# Patient Record
Sex: FEMALE | Race: BLACK OR AFRICAN AMERICAN | NOT HISPANIC OR LATINO | URBAN - METROPOLITAN AREA
[De-identification: names, ages, dates, MRNs, and addresses within clinical notes are randomized per-mention and may not be internally consistent; named-entity substitution may affect disease eponyms.]

---

## 2020-07-12 ENCOUNTER — EMERGENCY (EMERGENCY)
Facility: HOSPITAL | Age: 25
LOS: 0 days | Discharge: HOME | End: 2020-07-12
Attending: EMERGENCY MEDICINE
Payer: COMMERCIAL

## 2020-07-12 VITALS
HEIGHT: 64 IN | RESPIRATION RATE: 20 BRPM | OXYGEN SATURATION: 99 % | SYSTOLIC BLOOD PRESSURE: 87 MMHG | HEART RATE: 95 BPM | DIASTOLIC BLOOD PRESSURE: 83 MMHG | TEMPERATURE: 99 F | WEIGHT: 132.06 LBS

## 2020-07-12 VITALS
OXYGEN SATURATION: 99 % | HEART RATE: 90 BPM | DIASTOLIC BLOOD PRESSURE: 75 MMHG | SYSTOLIC BLOOD PRESSURE: 123 MMHG | RESPIRATION RATE: 19 BRPM

## 2020-07-12 PROCEDURE — 99284 EMERGENCY DEPT VISIT MOD MDM: CPT

## 2020-07-12 PROCEDURE — 93010 ELECTROCARDIOGRAM REPORT: CPT

## 2020-07-12 PROCEDURE — 71045 X-RAY EXAM CHEST 1 VIEW: CPT | Mod: 26

## 2020-07-12 RX ORDER — SIMETHICONE 80 MG/1
1 TABLET, CHEWABLE ORAL
Qty: 21 | Refills: 0
Start: 2020-07-12 | End: 2020-07-18

## 2020-07-12 RX ORDER — SIMETHICONE 80 MG/1
80 TABLET, CHEWABLE ORAL ONCE
Refills: 0 | Status: DISCONTINUED | OUTPATIENT
Start: 2020-07-12 | End: 2020-07-12

## 2020-07-12 RX ORDER — CALCIUM CARBONATE 500(1250)
1 TABLET ORAL
Qty: 7 | Refills: 0
Start: 2020-07-12 | End: 2020-07-18

## 2020-07-12 RX ADMIN — Medication 30 MILLILITER(S): at 21:44

## 2020-07-12 NOTE — ED ADULT TRIAGE NOTE - CHIEF COMPLAINT QUOTE
Pt c/o chest discomfort and excessive burping x yesterday. pt stated she drank hot tea to help at home, with no relief.

## 2020-07-12 NOTE — ED PROVIDER NOTE - OBJECTIVE STATEMENT
23 yo F with epigastric discomfort, feels like she has gas that needs to come out, but can't burp it out.  Pt. was worried because it feels tight in her chest.  She felt this once before a week ago and it went out.   ROS: negative for fever, cough, headache, chest pain, shortness of breath, abd pain, nausea, vomiting, diarrhea, rash, paresthesia, and weakness--all other systems reviewed are negative.   PMH: Denies; Meds: Denies; SH: Denies smoking/drinking/drug use

## 2020-07-12 NOTE — ED PROVIDER NOTE - PATIENT PORTAL LINK FT
You can access the FollowMyHealth Patient Portal offered by [facility] by registering at the following website: [email or web address]. By joining [email or web address]’s FollowMyHealth portal, you will also be able to view your health information using other applications (apps) compatible with our system.

## 2020-07-12 NOTE — ED PROVIDER NOTE - PROGRESS NOTE DETAILS
Results reported to patient--grossly benign, XR clear   Pt. reports feeling better after meds  pt. agrees to f/u with primary care outpt., referred to GI for f/u   pt. understands to return to ED if symptoms worsen; will d/c with meds

## 2020-07-12 NOTE — ED PROVIDER NOTE - CLINICAL SUMMARY MEDICAL DECISION MAKING FREE TEXT BOX
23 yo F with indigestion/gas, doubt cardiac ischemia/arrhythmi/pneumo  -cxr, ekg, maalox and simethicone  -f/u results, reeval

## 2020-07-12 NOTE — ED PROVIDER NOTE - CARE PROVIDER_API CALL
Francesco Verma [address] [address]  Phone: [phone]  Fax: [phone]  Follow Up Time: 4-6 Days

## 2020-07-14 DIAGNOSIS — R10.13 EPIGASTRIC PAIN: ICD-10-CM

## 2020-07-14 DIAGNOSIS — K30 FUNCTIONAL DYSPEPSIA: ICD-10-CM

## 2020-07-21 ENCOUNTER — EMERGENCY (EMERGENCY)
Facility: HOSPITAL | Age: 25
LOS: 1 days | Discharge: HOME | End: 2020-07-21
Attending: EMERGENCY MEDICINE | Admitting: EMERGENCY MEDICINE
Payer: COMMERCIAL

## 2020-07-21 VITALS
RESPIRATION RATE: 16 BRPM | HEIGHT: 64 IN | TEMPERATURE: 98 F | SYSTOLIC BLOOD PRESSURE: 113 MMHG | HEART RATE: 90 BPM | WEIGHT: 134.92 LBS | DIASTOLIC BLOOD PRESSURE: 65 MMHG | OXYGEN SATURATION: 100 %

## 2020-07-21 PROCEDURE — 99283 EMERGENCY DEPT VISIT LOW MDM: CPT

## 2020-07-22 PROBLEM — Z78.9 OTHER SPECIFIED HEALTH STATUS: Chronic | Status: ACTIVE | Noted: 2020-07-12

## 2020-07-22 RX ORDER — FAMOTIDINE 10 MG/ML
20 INJECTION INTRAVENOUS DAILY
Refills: 0 | Status: DISCONTINUED | OUTPATIENT
Start: 2020-07-22 | End: 2020-07-25

## 2020-07-22 RX ORDER — LIDOCAINE 4 G/100G
10 CREAM TOPICAL ONCE
Refills: 0 | Status: COMPLETED | OUTPATIENT
Start: 2020-07-22 | End: 2020-07-22

## 2020-07-22 RX ORDER — FAMOTIDINE 10 MG/ML
1 INJECTION INTRAVENOUS
Qty: 14 | Refills: 0
Start: 2020-07-22 | End: 2020-08-04

## 2020-07-22 RX ADMIN — LIDOCAINE 10 MILLILITER(S): 4 CREAM TOPICAL at 01:12

## 2020-07-22 RX ADMIN — FAMOTIDINE 20 MILLIGRAM(S): 10 INJECTION INTRAVENOUS at 01:15

## 2020-07-22 RX ADMIN — Medication 30 MILLILITER(S): at 01:12

## 2020-07-22 NOTE — ED PROVIDER NOTE - OBJECTIVE STATEMENT
23 yo F no sig pmh presents with sore throat.  Patient states she was cooking around 8 pm when she felt acute onset pain in her throat.  It is located below thyroid level at midline, sharp, worse with swallowing.  Able to tolerate solids and liquids, no voice changes.  Patient reports seeing GI doctor earlier to day for "Gas", was prescribed medication to pharmacy but does not know what this was.  Has appointment with ENT in two days.  Denies sharp food consumption or fish bones.  Denies fever, chills, nausea, vomiting, chest pain, abdominal pain.  Occasional marijuana user.  Denies etoh or tobacco use.

## 2020-07-22 NOTE — ED PROVIDER NOTE - CLINICAL SUMMARY MEDICAL DECISION MAKING FREE TEXT BOX
25 yo F no sig pmh presents with sore throat x1 day.  VSS AF.  Exam non-toxic appearing, oropharyngeal exam unremarkable, non-tender thyroid.  No clear infectious etiology, tonsils and uvula appear normal.  Non-tender thyroid, low c/f thyroiditis.  No history suggestive of impacted foreign body.  Neck supple, low c/f retropharyngeal abscess.  Possible gerd given patient follow with GI.  Low c/f atypical ACS overall, but will obtain ekg.  Plan for supportive care.  Patient has follow up with ent in two days.

## 2020-07-22 NOTE — ED PROVIDER NOTE - PATIENT PORTAL LINK FT
You can access the FollowMyHealth Patient Portal offered by [facility] by registering at the following website: [email or web address]. By joining [employer]’s FollowMyHealth portal, you will also be able to view your health information using other applications (apps) compatible with our system.

## 2020-07-22 NOTE — ED PROVIDER NOTE - PROGRESS NOTE DETAILS
JONES:  EKG without acute ischemic change.  Patient reports improvement in sx.  Exam remains unremarkable.  Patient has ENT clinic appointment in two days.

## 2020-07-22 NOTE — ED ADULT NURSE NOTE - OBJECTIVE STATEMENT
Pt received in INT10C. C/o sore throat since this morning. Denies SOB. Denies difficulty swallowing. Denies fever or chills. A&Ox4, ambulatory. Breathing even and unlabored. Pt able to speak in full sentences. Not in apparent distress. Medications given as per provider's order. Will continue to monitor.

## 2020-07-22 NOTE — ED PROVIDER NOTE - PHYSICAL EXAMINATION
GEN:  Non-toxic appearing, non-distressed, speaking full sentences, non-diaphoretic, AAOx3  HEENT:  NCAT, neck supple, EOMI, PERRLA, sclera anicteric, no conjunctival pallor or injection, no stridor, normal voice, no tonsillar exudate, uvula midline, tympanic membranes and external auditory canals normal appearing bilaterally, non-tender thyroid  CV:  regular rhythm and rate, s1/s2 audible, no murmurs, rubs or gallops, peripheral pulses 2+ and symmetric  PULM:  non-labored respirations, lungs clear to auscultation bilaterally, no wheezes, crackles or rales  ABD:  non distended, non-tender, no rebound, no guarding, negative Knapp's sign, bowel sounds normal, no cvat  MSK:  no gross deformity, non-tender extremities and joints, range of motion grossly normal appearing, no extremity edema, extremities warm and well perfused   NEURO:  AAOx3, CN II-XII intact, motor 5/5 in upper and lower extremities bilaterally, sensation grossly intact in extremities and trunk, finger to nose testing wnl, no nystagmus, negative Romberg, no pronator drift, no gait deficit  SKIN:  warm, dry, no rash or vesicles

## 2022-05-04 ENCOUNTER — EMERGENCY (EMERGENCY)
Facility: HOSPITAL | Age: 27
LOS: 1 days | Discharge: HOME | End: 2022-05-04
Attending: STUDENT IN AN ORGANIZED HEALTH CARE EDUCATION/TRAINING PROGRAM | Admitting: STUDENT IN AN ORGANIZED HEALTH CARE EDUCATION/TRAINING PROGRAM
Payer: MEDICAID

## 2022-05-04 VITALS
HEIGHT: 64 IN | OXYGEN SATURATION: 100 % | DIASTOLIC BLOOD PRESSURE: 62 MMHG | HEART RATE: 100 BPM | RESPIRATION RATE: 16 BRPM | TEMPERATURE: 98 F | SYSTOLIC BLOOD PRESSURE: 119 MMHG

## 2022-05-04 PROCEDURE — 70450 CT HEAD/BRAIN W/O DYE: CPT | Mod: 26,MB

## 2022-05-04 PROCEDURE — 99284 EMERGENCY DEPT VISIT MOD MDM: CPT

## 2022-05-04 RX ORDER — IBUPROFEN 200 MG
400 TABLET ORAL ONCE
Refills: 0 | Status: COMPLETED | OUTPATIENT
Start: 2022-05-04 | End: 2022-05-04

## 2022-05-04 RX ORDER — ACETAMINOPHEN 500 MG
650 TABLET ORAL ONCE
Refills: 0 | Status: COMPLETED | OUTPATIENT
Start: 2022-05-04 | End: 2022-05-04

## 2022-05-04 RX ADMIN — Medication 400 MILLIGRAM(S): at 16:19

## 2022-05-04 RX ADMIN — Medication 650 MILLIGRAM(S): at 16:20

## 2022-05-04 RX ADMIN — Medication 1 TABLET(S): at 16:19
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
## 2022-05-04 NOTE — ED PROVIDER NOTE - PATIENT PORTAL LINK FT
You can access the FollowMyHealth Patient Portal offered by [facility] by registering at the following website: [email or web address]. By joining [employer]’s FollowMyHealth portal, you will also be able to view your health information using other applications (apps) compatible with our system.
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
## 2022-05-04 NOTE — ED PROVIDER NOTE - NS ED ROS FT
CONSTITUTIONAL: No fever,  Pulmonary: no cough  CARDIOVASCULAR: No chest pain,  GI: No abdominal pain  MUSKULOSKELETAL: No new pain in joints/muscles  SKIN: New bite marks  ALL OTHER SYSTEMS NEGATIVE.
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
## 2022-08-05 NOTE — ED ADULT NURSE NOTE - OBJECTIVE STATEMENT
24 year old female presents to ED with c/o epigastric pain and belching. Presents to ED a&ox3, in no acute distress. abd soft, non-distended, non-tender. Denies fever/chills,  n/v/d/urinary complaints, or other symptoms.
no

## 2024-12-30 ENCOUNTER — EMERGENCY (EMERGENCY)
Facility: HOSPITAL | Age: 29
LOS: 1 days | Discharge: HOME | End: 2024-12-30
Admitting: STUDENT IN AN ORGANIZED HEALTH CARE EDUCATION/TRAINING PROGRAM
Payer: MEDICAID

## 2024-12-30 VITALS
RESPIRATION RATE: 16 BRPM | WEIGHT: 130.07 LBS | DIASTOLIC BLOOD PRESSURE: 73 MMHG | TEMPERATURE: 98 F | OXYGEN SATURATION: 99 % | SYSTOLIC BLOOD PRESSURE: 116 MMHG | HEART RATE: 81 BPM

## 2024-12-30 PROCEDURE — 93010 ELECTROCARDIOGRAM REPORT: CPT

## 2024-12-30 PROCEDURE — 99284 EMERGENCY DEPT VISIT MOD MDM: CPT

## 2024-12-30 RX ORDER — KETOROLAC TROMETHAMINE 30 MG/ML
15 INJECTION INTRAMUSCULAR; INTRAVENOUS ONCE
Refills: 0 | Status: DISCONTINUED | OUTPATIENT
Start: 2024-12-30 | End: 2024-12-30

## 2024-12-30 RX ORDER — METOCLOPRAMIDE HYDROCHLORIDE 10 MG/1
10 TABLET ORAL ONCE
Refills: 0 | Status: COMPLETED | OUTPATIENT
Start: 2024-12-30 | End: 2024-12-30

## 2024-12-30 RX ORDER — SODIUM CHLORIDE 9 MG/ML
1000 INJECTION, SOLUTION INTRAMUSCULAR; INTRAVENOUS; SUBCUTANEOUS ONCE
Refills: 0 | Status: COMPLETED | OUTPATIENT
Start: 2024-12-30 | End: 2024-12-30

## 2024-12-30 RX ADMIN — KETOROLAC TROMETHAMINE 15 MILLIGRAM(S): 30 INJECTION INTRAMUSCULAR; INTRAVENOUS at 10:41

## 2024-12-30 RX ADMIN — SODIUM CHLORIDE 1000 MILLILITER(S): 9 INJECTION, SOLUTION INTRAMUSCULAR; INTRAVENOUS; SUBCUTANEOUS at 10:40

## 2024-12-30 RX ADMIN — METOCLOPRAMIDE HYDROCHLORIDE 10 MILLIGRAM(S): 10 TABLET ORAL at 10:41

## 2024-12-30 NOTE — ED ADULT NURSE NOTE - OBJECTIVE STATEMENT
Patient A&o X4, received in intake , with complaints of headache/SOB. Patient states, "I have been having a headache since 4am this morning ". Patient also complains of intermittent SOB, reports feeling anxious. Patient able to speak in clear sentences, respirations equal and unlabored. Lung sounds clear b/l, equal chest rise and fall noted. Denies CP, fever, chills, nausea, vomiting and diarrhea at this time. Skin warm and dry. Provider at bedside for eval, pending further orders.

## 2024-12-30 NOTE — ED PROVIDER NOTE - NSFOLLOWUPINSTRUCTIONS_ED_ALL_ED_FT
Migraine Headache  A migraine headache is a very strong throbbing pain on one or both sides of your head. This type of headache can also cause other symptoms. It can last from 4 hours to 3 days. Talk with your doctor about what things may bring on (trigger) this condition.    What are the causes?  The exact cause of a migraine is not known. This condition may be brought on or caused by:  Smoking.  Medicines, such as:  Medicine used to treat chest pain (nitroglycerin).  Birth control pills.  Estrogen.  Some blood pressure medicines.  Certain substances in some foods or drinks.  Foods and drinks, such as:  Cheese.  Chocolate.  Alcohol.  Caffeine.  Doing physical activity that is very hard.  Other things that may trigger a migraine headache include:  Periods.  Pregnancy.  Hunger.  Stress.  Getting too much or too little sleep.  Weather changes.  Feeling tired (fatigue).  What increases the risk?  Being 25–55 years old.  Being female.  Having a family history of migraine headaches.  Being .  Having a mental health condition, such as being sad (depressed) or feeling worried or nervous (anxious).  Being very overweight (obese).  What are the signs or symptoms?  A throbbing pain. This pain may:  Happen in any area of the head, such as on one or both sides.  Make it hard to do daily activities.  Get worse with physical activity.  Get worse around bright lights, loud noises, or smells.  Other symptoms may include:  Feeling like you may vomit (nauseous).  Vomiting.  Dizziness.  Before a migraine headache starts, you may get warning signs (an aura). An aura may include:  Seeing flashing lights or having blind spots.  Seeing bright spots, halos, or zigzag lines.  Having tunnel vision or blurred vision.  Having numbness or a tingling feeling.  Having trouble talking.  Having weak muscles.  After a migraine ends, you may have symptoms. These may include:  Tiredness.  Trouble thinking (concentrating).  How is this treated?  Taking medicines that:  Relieve pain.  Relieve the feeling like you may vomit.  Prevent migraine headaches.  Treatment may also include:  Acupuncture.  Lifestyle changes like avoiding foods that bring on migraine headaches.  Learning ways to control your body functions (biofeedback).  Therapy to help you know and deal with negative thoughts (cognitive behavioral therapy).  Follow these instructions at home:  Medicines    Take over-the-counter and prescription medicines only as told by your doctor.  If told, take steps to prevent problems with pooping (constipation). You may need to:  Drink enough fluid to keep your pee (urine) pale yellow.  Take medicines. You will be told what medicines to take.  Eat foods that are high in fiber. These include beans, whole grains, and fresh fruits and vegetables.  Limit foods that are high in fat and sugar. These include fried or sweet foods.  Ask your doctor if you should avoid driving or using machines while you are taking your medicine.    Lifestyle    A person sitting on the floor doing yoga.  Do not drink alcohol.  Do not smoke or use any products that contain nicotine or tobacco. If you need help quitting, ask your doctor.  Get 7–9 hours of sleep each night, or the amount recommended by your doctor.  Find ways to deal with stress, such as meditation, deep breathing, or yoga.  Try to exercise often. This can help lessen how bad and how often your migraines happen.  General instructions    Keep a journal to find out what may bring on your migraine headaches. This can help you avoid those things. For example, write down:  What you eat and drink.  How much sleep you get.  Any change to your medicines or diet.  If you have a migraine headache:  Avoid things that make your symptoms worse, such as bright lights.  Lie down in a dark, quiet room.  Do not drive or use machinery.  Ask your doctor what activities are safe for you.  Where to find more information  Coalition for Headache and Migraine Patients (CHAMP): headachemigraine.org  American Migraine Foundation: americanmigrainefoundation.org  National Headache Foundation: headaches.org  Contact a doctor if:  You get a migraine headache that is different or worse than others you have had.  You have more than 15 days of headaches in one month.  Get help right away if:  Your migraine headache gets very bad.  Your migraine headache lasts more than 72 hours.  You have a fever or stiff neck.  You have trouble seeing.  Your muscles feel weak or like you cannot control them.  You lose your balance a lot.  You have trouble walking.  You faint.  You have a seizure.  This information is not intended to replace advice given to you by your health care provider. Make sure you discuss any questions you have with your health care provider. **  You are seen in the emergency room for a headache.  Your headache improved with medication in the emergency room.  You have been seen by neurologist in the past however you have requested to have a referral to see a new one. You have been provided a referral to the specialist as discussed, the discharge lounge will help schedule an appointment for you. If you do not receive a call within 2 days you may call [phone] extension number [phone] or [phone] to speak to someone from the discharge lounge. Thank you. You were given Toradol 15 mg IV while in the emergency room, please do not take ibuprofen for the next 8 hours but after this you may proceed by taking ibuprofen 600 mg every 6 hours as needed for pain, as well as acetaminophen 1000 mg every 6 hours as needed for pain.  Please do not take acetaminophen and your [facility] migraine medication together as both contain acetaminophen which will cause you to overdose on acetaminophen dose.  Please return if you are having any new, worsening, or concerning symptoms as well.  Please follow-up with your primary care doctor within 2 weeks.  **  Migraine Headache  A migraine headache is a very strong throbbing pain on one or both sides of your head. This type of headache can also cause other symptoms. It can last from 4 hours to 3 days. Talk with your doctor about what things may bring on (trigger) this condition.    What are the causes?  The exact cause of a migraine is not known. This condition may be brought on or caused by:  Smoking.  Medicines, such as:  Medicine used to treat chest pain (nitroglycerin).  Birth control pills.  Estrogen.  Some blood pressure medicines.  Certain substances in some foods or drinks.  Foods and drinks, such as:  Cheese.  Chocolate.  Alcohol.  Caffeine.  Doing physical activity that is very hard.  Other things that may trigger a migraine headache include:  Periods.  Pregnancy.  Hunger.  Stress.  Getting too much or too little sleep.  Weather changes.  Feeling tired (fatigue).  What increases the risk?  Being 25–55 years old.  Being female.  Having a family history of migraine headaches.  Being .  Having a mental health condition, such as being sad (depressed) or feeling worried or nervous (anxious).  Being very overweight (obese).  What are the signs or symptoms?  A throbbing pain. This pain may:  Happen in any area of the head, such as on one or both sides.  Make it hard to do daily activities.  Get worse with physical activity.  Get worse around bright lights, loud noises, or smells.  Other symptoms may include:  Feeling like you may vomit (nauseous).  Vomiting.  Dizziness.  Before a migraine headache starts, you may get warning signs (an aura). An aura may include:  Seeing flashing lights or having blind spots.  Seeing bright spots, halos, or zigzag lines.  Having tunnel vision or blurred vision.  Having numbness or a tingling feeling.  Having trouble talking.  Having weak muscles.  After a migraine ends, you may have symptoms. These may include:  Tiredness.  Trouble thinking (concentrating).  How is this treated?  Taking medicines that:  Relieve pain.  Relieve the feeling like you may vomit.  Prevent migraine headaches.  Treatment may also include:  Acupuncture.  Lifestyle changes like avoiding foods that bring on migraine headaches.  Learning ways to control your body functions (biofeedback).  Therapy to help you know and deal with negative thoughts (cognitive behavioral therapy).  Follow these instructions at home:  Medicines    Take over-the-counter and prescription medicines only as told by your doctor.  If told, take steps to prevent problems with pooping (constipation). You may need to:  Drink enough fluid to keep your pee (urine) pale yellow.  Take medicines. You will be told what medicines to take.  Eat foods that are high in fiber. These include beans, whole grains, and fresh fruits and vegetables.  Limit foods that are high in fat and sugar. These include fried or sweet foods.  Ask your doctor if you should avoid driving or using machines while you are taking your medicine.    Lifestyle    A person sitting on the floor doing yoga.  Do not drink alcohol.  Do not smoke or use any products that contain nicotine or tobacco. If you need help quitting, ask your doctor.  Get 7–9 hours of sleep each night, or the amount recommended by your doctor.  Find ways to deal with stress, such as meditation, deep breathing, or yoga.  Try to exercise often. This can help lessen how bad and how often your migraines happen.  General instructions    Keep a journal to find out what may bring on your migraine headaches. This can help you avoid those things. For example, write down:  What you eat and drink.  How much sleep you get.  Any change to your medicines or diet.  If you have a migraine headache:  Avoid things that make your symptoms worse, such as bright lights.  Lie down in a dark, quiet room.  Do not drive or use machinery.  Ask your doctor what activities are safe for you.  Where to find more information  Coalition for Headache and Migraine Patients (CHAMP): headachemigraine.org  American Migraine Foundation: americanmigrainefoundation.org  National Headache Foundation: headaches.org  Contact a doctor if:  You get a migraine headache that is different or worse than others you have had.  You have more than 15 days of headaches in one month.  Get help right away if:  Your migraine headache gets very bad.  Your migraine headache lasts more than 72 hours.  You have a fever or stiff neck.  You have trouble seeing.  Your muscles feel weak or like you cannot control them.  You lose your balance a lot.  You have trouble walking.  You faint.  You have a seizure.  This information is not intended to replace advice given to you by your health care provider. Make sure you discuss any questions you have with your health care provider.

## 2024-12-30 NOTE — ED PROVIDER NOTE - OBJECTIVE STATEMENT
29-year-old female with past medical history of migraine headaches (diagnosed by neurologist 3 years ago,) presenting with atraumatic nonradiating left-sided headache that started last night however has been progressively getting worse.  Patient states she was prescribed a migraine medication that starts with an, but is not sure the name and has not been taking this medication as feels it has not been helpful.  States she took acetaminophen this morning around 5 AM then took a [facility] migraine medication (looked up online with patient is acetaminophen 250 mg, aspirin  250 mg and caffeine 65 mg) at 8 AM.  Patient states the headache did not ease up therefore called the ambulance to come to the ER.  States headache feels similar to migraine headaches in the past.  No personal or family history of cerebral aneurysms or strokes.  Patient states she had her aura of room spinning sensation, and nausea which have since resolved however in triage also endorsed shortness of breath.  When asked to clarify shortness of breath patient states when she was dizzy she had a fleeting sensation of heavy breathing but never felt like she was not getting enough air or any dyspnea on exertion.  She states this fleeting sensation of heavy breathing is very common when she gets her aura of dizziness with more migraine headaches.  Headache was gradual in nature, not sudden onset.  Is not the worst headache of her life.      Denies Syncope, loss of consciousness, retrograde amnesia, double vision, tinnitus, dental pain or swelling, ear pain, paresthesias, weakness, inability to walk, slurred speech, facial asymmetry, confusion, sore throat, neck stiffness, fever or chills, chest pain, shortness of breath, palpitations, lower leg swelling or pain, abdominal pain, vomiting, diarrhea, rash. 29-year-old female with past medical history of migraine headaches (diagnosed by neurologist 3 years ago,) presenting with atraumatic nonradiating left-sided headache that started last night however has been progressively getting worse.  Patient states she was prescribed a migraine medication that starts with an, but is not sure the name and has not been taking this medication as feels it has not been helpful.  States she took acetaminophen this morning around 5 AM then took a [facility] migraine medication (looked up online with patient is acetaminophen 250 mg, aspirin  250 mg and caffeine 65 mg) at 8 AM.  Patient states the headache did not ease up therefore called the ambulance to come to the ER.  States headache feels similar to migraine headaches in the past.  No personal or family history of cerebral aneurysms or strokes.  Patient states she had her aura of room spinning sensation, and nausea which have since resolved however in triage also endorsed shortness of breath.  When asked to clarify shortness of breath patient states when she was dizzy she had a fleeting sensation of heavy breathing but never felt like she was not getting enough air or any dyspnea on exertion.  She states this fleeting sensation of heavy breathing is very common when she gets her aura of dizziness with more migraine headaches.  Headache was gradual in nature, not sudden onset.  Is not the worst headache of her life.  Endorses mild photophobia and phonophobia    Denies Syncope, loss of consciousness, retrograde amnesia, double vision, tinnitus, dental pain or swelling, ear pain, paresthesias, weakness, inability to walk, slurred speech, facial asymmetry, confusion, sore throat, neck stiffness, fever or chills, chest pain, shortness of breath, palpitations, lower leg swelling or pain, abdominal pain, vomiting, diarrhea, rash.

## 2024-12-30 NOTE — ED PROVIDER NOTE - NSPTACCESSSVCSAPPTDETAILS_ED_ALL_ED_FT
normal...
Patient has seen neurology in the past for migraine headache but is requesting a new neurology. She is also hoping to see if she can be referred to dental for routine dental follow up as has not seen a dentist. Pt was also provided with handout for clinic information. Thank you!

## 2024-12-30 NOTE — ED ADULT TRIAGE NOTE - CHIEF COMPLAINT QUOTE
pt brought in by EMS from home complaining of intermittent SOB and migraines. pt states she has not been compliant with migraine meds. pt denies chest pain, n/v/d, fever or chills.

## 2024-12-30 NOTE — ED PROVIDER NOTE - CLINICAL SUMMARY MEDICAL DECISION MAKING FREE TEXT BOX
29-year-old female with past medical history of migraine headaches (diagnosed by neurologist 3 years ago,) presenting with atraumatic nonradiating left-sided headache that started last night however has been progressively getting worse. 29-year-old female with past medical history of migraine headaches (diagnosed by neurologist 3 years ago,) presenting with atraumatic nonradiating left-sided headache that started last night however has been progressively getting worse.    Differential diagnosis includes migraine versus tension type headache. No headache red flags. Neurologic exam without evidence of meningismus, focal neurologic findings. Presentation not consistent with acute intracranial bleed to include SAH (lack of risk factors, headache history). Presentation not consistent with acute CNS infection to include meningitis or brain abscess, Temporal arteritis unlikely, as is acute angle closure glaucoma given history and physical findings. Presentation not consistent with other acute, emergent causes of headache at this time. Plan to treat symptomatically with pain medication. No indication for imaging/LP at this time.    Plan: pain medication, IVF, reassess 29-year-old female with past medical history of migraine headaches (diagnosed by neurologist 3 years ago,) presenting with atraumatic nonradiating left-sided headache that started last night however has been progressively getting worse.    Differential diagnosis includes migraine versus tension type headache. Headache was helped by turning lights off in exam room. No headache red flags. Neurologic exam without evidence of meningismus, focal neurologic findings. Presentation not consistent with acute intracranial bleed to include SAH (lack of risk factors, headache history). Presentation not consistent with acute CNS infection to include meningitis or brain abscess, Temporal arteritis unlikely, as is acute angle closure glaucoma given history and physical findings. Presentation not consistent with other acute, emergent causes of headache at this time. Plan to treat symptomatically with pain medication. No indication for imaging/LP at this time.    Plan: pain medication, IVF, reassess    EKG was done in triage given pt reported shortness of breath but appears is likely 2/2 the aura of vertigo that preceded the migraine that has since resolved

## 2024-12-30 NOTE — ED PROVIDER NOTE - PATIENT PORTAL LINK FT
You can access the FollowMyHealth Patient Portal offered by [facility] by registering at the following website: [email or web address]. By joining [facility]’s FollowMyHealth portal, you will also be able to view your health information using other applications (apps) compatible with our system.

## 2024-12-30 NOTE — ED PROVIDER NOTE - PROGRESS NOTE DETAILS
SADIQ Christensen: Pt seen ambulating comfortably to the restroom stable on her feet, appears less sensitivity to the light SADIQ Christensen: Patient states she is feeling significantly better and like to go home.  Patient also requesting referral for new neurologist as feels she is not getting cohesive care with her current neurologist.  In addition to this patient also requesting dental referral/follow-up information as has not seen a dentist in some time (unrelated to today's visit.) discussed tricked return precautions and prompt follow-up with primary care doctor.  Patient verbalized an understanding and agrees with the plan.

## 2025-01-07 PROBLEM — Z00.00 ENCOUNTER FOR PREVENTIVE HEALTH EXAMINATION: Status: ACTIVE | Noted: 2025-01-07

## 2025-01-08 ENCOUNTER — APPOINTMENT (OUTPATIENT)
Dept: NEUROLOGY | Facility: CLINIC | Age: 30
End: 2025-01-08